# Patient Record
Sex: FEMALE | Race: WHITE
[De-identification: names, ages, dates, MRNs, and addresses within clinical notes are randomized per-mention and may not be internally consistent; named-entity substitution may affect disease eponyms.]

---

## 2019-04-13 ENCOUNTER — HOSPITAL ENCOUNTER (EMERGENCY)
Dept: HOSPITAL 26 - MED | Age: 59
LOS: 1 days | Discharge: HOME | End: 2019-04-14
Payer: MEDICAID

## 2019-04-13 VITALS — WEIGHT: 146 LBS | HEIGHT: 67 IN | BODY MASS INDEX: 22.91 KG/M2

## 2019-04-13 VITALS — DIASTOLIC BLOOD PRESSURE: 84 MMHG | SYSTOLIC BLOOD PRESSURE: 147 MMHG
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
## 2019-04-13 DIAGNOSIS — S52.501A: Primary | ICD-10-CM

## 2019-04-14 VITALS — DIASTOLIC BLOOD PRESSURE: 104 MMHG | SYSTOLIC BLOOD PRESSURE: 137 MMHG

## 2019-04-14 NOTE — NUR
Patient discharged with v/s stable. Written and verbal after care instructions 
given and explained. 

Patient alert, oriented and verbalized understanding of instructions. 
Ambulatory with steady gait. All questions addressed prior to discharge. ID 
band removed. Patient advised to follow up with PMD. Rx of Norco given. Patient 
educated on indication of medication including possible reaction and side 
effects. [place] splint in place with ace wrap to right arm. Cap refill <3 
seconds, brisk. Teaching and care for splint provided and reinforced. 
Opportunity to ask questions provided and answered.

## 2019-04-14 NOTE — NUR
PT IS A 57 Y/O FEMALE WHO PRESENTS TO THE ED C/O R ARM PAIN S/P FALL. PT STATES 
THAT SHE WAS AT HOME AND FELL ON HER OUTSTRETCHED R ARM. PT DENIES LOC, NO 
OBVIOUS TRAUMA/DEFORMITY, CMS INTACT, NOTED SELF REMEDY WRAP TO R ARM. PT 
REPORTS 8/10 ACHING R ARM PAIN THAT DOES NOT RADIATE. PT DENIES CP, SOB, N/V/D. 
PT AWAKE AND ALERT, RR EVEN/UNLABORED. PT REPOSITIONED FOR COMFORT, BED IN 
LOWEST POSITION. ER MD DR. RAMÍREZ NOTIFIED. WILL CONTINUE TO MONITOR. 



DENIES PMH

NKA 

-------------------------------------------------------------------------------

Addendum: 04/14/19 at 0012 by [email or web address]

-------------------------------------------------------------------------------

PT IS A 57 Y/O FEMALE WHO PRESENTS TO THE ED C/O R ARM PAIN S/P FALL. PT STATES 
THAT SHE WAS AT HOME AND FELL ON HER OUTSTRETCHED R ARM. PT DENIES LOC, NO 
OBVIOUS TRAUMA/DEFORMITY, CMS INTACT, NOTED SELF REMEDY WRAP TO R ARM. PT 
REPORTS 8/10 ACHING R ARM PAIN THAT DOES NOT RADIATE. PT DENIES CP, SOB, N/V/D. 
PT AWAKE AND ALERT, RR EVEN/UNLABORED. PT REPOSITIONED FOR COMFORT, BED IN 
LOWEST POSITION. WILL CONTINUE TO MONITOR.